# Patient Record
Sex: FEMALE | URBAN - METROPOLITAN AREA
[De-identification: names, ages, dates, MRNs, and addresses within clinical notes are randomized per-mention and may not be internally consistent; named-entity substitution may affect disease eponyms.]

---

## 2024-08-14 ENCOUNTER — CLAIMS CREATED FROM THE CLAIM WINDOW (OUTPATIENT)
Dept: URBAN - METROPOLITAN AREA MEDICAL CENTER 19 | Facility: MEDICAL CENTER | Age: 44
End: 2024-08-14
Payer: SELF-PAY

## 2024-08-14 DIAGNOSIS — K92.1 ACUTE MELENA: ICD-10-CM

## 2024-08-14 DIAGNOSIS — K92.0 BLOODY EMESIS: ICD-10-CM

## 2024-08-14 DIAGNOSIS — K74.69 CIRRHOSIS, CRYPTOGENIC: ICD-10-CM

## 2024-08-14 DIAGNOSIS — D62 ACUTE POSTHEMORRHAGIC ANEMIA: ICD-10-CM

## 2024-08-14 PROCEDURE — 99255 IP/OBS CONSLTJ NEW/EST HI 80: CPT | Performed by: INTERNAL MEDICINE

## 2024-08-14 PROCEDURE — 99223 1ST HOSP IP/OBS HIGH 75: CPT | Performed by: INTERNAL MEDICINE

## 2024-08-15 ENCOUNTER — CLAIMS CREATED FROM THE CLAIM WINDOW (OUTPATIENT)
Dept: URBAN - METROPOLITAN AREA MEDICAL CENTER 19 | Facility: MEDICAL CENTER | Age: 44
End: 2024-08-15
Payer: SELF-PAY

## 2024-08-15 DIAGNOSIS — K31.819 ACQUIRED ARTERIOVENOUS MALFORMATION OF DUODENUM: ICD-10-CM

## 2024-08-15 DIAGNOSIS — K29.60 ADENOPAPILLOMATOSIS GASTRICA: ICD-10-CM

## 2024-08-15 DIAGNOSIS — K22.89 OTHER SPECIFIED DISEASE OF ESOPHAGUS: ICD-10-CM

## 2024-08-15 PROCEDURE — 43270 EGD LESION ABLATION: CPT | Performed by: INTERNAL MEDICINE

## 2024-08-15 PROCEDURE — 43239 EGD BIOPSY SINGLE/MULTIPLE: CPT | Performed by: INTERNAL MEDICINE

## 2024-08-16 ENCOUNTER — CLAIMS CREATED FROM THE CLAIM WINDOW (OUTPATIENT)
Dept: URBAN - METROPOLITAN AREA MEDICAL CENTER 19 | Facility: MEDICAL CENTER | Age: 44
End: 2024-08-16
Payer: SELF-PAY

## 2024-08-16 DIAGNOSIS — K74.69 CIRRHOSIS, CRYPTOGENIC: ICD-10-CM

## 2024-08-16 DIAGNOSIS — R19.7 ACUTE DIARRHEA: ICD-10-CM

## 2024-08-16 DIAGNOSIS — D50.9 ANEMIA: ICD-10-CM

## 2024-08-16 DIAGNOSIS — K62.5 ANAL BLEEDING: ICD-10-CM

## 2024-08-16 PROCEDURE — 99232 SBSQ HOSP IP/OBS MODERATE 35: CPT | Performed by: INTERNAL MEDICINE

## 2024-08-17 ENCOUNTER — CLAIMS CREATED FROM THE CLAIM WINDOW (OUTPATIENT)
Dept: URBAN - METROPOLITAN AREA MEDICAL CENTER 19 | Facility: MEDICAL CENTER | Age: 44
End: 2024-08-17
Payer: SELF-PAY

## 2024-08-17 DIAGNOSIS — K92.1 HEMATOCHEZIA: ICD-10-CM

## 2024-08-17 PROCEDURE — 45378 DIAGNOSTIC COLONOSCOPY: CPT | Performed by: INTERNAL MEDICINE

## 2024-08-18 ENCOUNTER — TELEPHONE ENCOUNTER (OUTPATIENT)
Dept: URBAN - METROPOLITAN AREA CLINIC 113 | Facility: CLINIC | Age: 44
End: 2024-08-18

## 2024-08-28 ENCOUNTER — TELEPHONE ENCOUNTER (OUTPATIENT)
Dept: URBAN - METROPOLITAN AREA CLINIC 113 | Facility: CLINIC | Age: 44
End: 2024-08-28

## 2024-09-03 ENCOUNTER — TELEPHONE ENCOUNTER (OUTPATIENT)
Dept: URBAN - METROPOLITAN AREA CLINIC 113 | Facility: CLINIC | Age: 44
End: 2024-09-03

## 2024-09-16 ENCOUNTER — OFFICE VISIT (OUTPATIENT)
Dept: URBAN - METROPOLITAN AREA CLINIC 113 | Facility: CLINIC | Age: 44
End: 2024-09-16

## 2024-11-19 ENCOUNTER — DASHBOARD ENCOUNTERS (OUTPATIENT)
Age: 44
End: 2024-11-19

## 2024-11-19 ENCOUNTER — OFFICE VISIT (OUTPATIENT)
Dept: URBAN - METROPOLITAN AREA CLINIC 113 | Facility: CLINIC | Age: 44
End: 2024-11-19
Payer: COMMERCIAL

## 2024-11-19 VITALS — WEIGHT: 252 LBS | HEIGHT: 60 IN | RESPIRATION RATE: 20 BRPM | TEMPERATURE: 97.7 F | BODY MASS INDEX: 49.48 KG/M2

## 2024-11-19 DIAGNOSIS — D50.0 IRON DEFICIENCY ANEMIA DUE TO CHRONIC BLOOD LOSS: ICD-10-CM

## 2024-11-19 DIAGNOSIS — E03.9 ACQUIRED HYPOTHYROIDISM: ICD-10-CM

## 2024-11-19 DIAGNOSIS — K70.31 ALCOHOLIC CIRRHOSIS OF LIVER WITH ASCITES: ICD-10-CM

## 2024-11-19 DIAGNOSIS — K31.89 OTHER DISEASES OF STOMACH AND DUODENUM: ICD-10-CM

## 2024-11-19 DIAGNOSIS — K70.11 ALCOHOLIC HEPATITIS WITH ASCITES: ICD-10-CM

## 2024-11-19 DIAGNOSIS — K76.6 PORTAL HYPERTENSION: ICD-10-CM

## 2024-11-19 DIAGNOSIS — K31.819 AVM (ARTERIOVENOUS MALFORMATION) OF DUODENUM, ACQUIRED: ICD-10-CM

## 2024-11-19 PROBLEM — 420054005: Status: ACTIVE | Noted: 2024-11-19

## 2024-11-19 PROBLEM — 111566002: Status: ACTIVE | Noted: 2024-11-19

## 2024-11-19 PROBLEM — 303082009: Status: ACTIVE | Noted: 2024-11-19

## 2024-11-19 PROBLEM — 724556004: Status: ACTIVE | Noted: 2024-11-19

## 2024-11-19 PROBLEM — 1082611000119101: Status: ACTIVE | Noted: 2024-11-19

## 2024-11-19 PROCEDURE — 99214 OFFICE O/P EST MOD 30 MIN: CPT | Performed by: INTERNAL MEDICINE

## 2024-11-19 RX ORDER — PANTOPRAZOLE SODIUM 20 MG/1
1 TABLET 1/2 TO 1 HOUR BEFORE MORNING MEAL TABLET, DELAYED RELEASE ORAL ONCE A DAY
Status: ACTIVE | COMMUNITY

## 2024-11-19 NOTE — HPI-TODAY'S VISIT:
The patient is a very pleasant 44-year-old female who returns today for follow-up after hospital admission.  Patient was seen in consultation by my partner Dr. Hernandes on August 14 of this year when she presented with melena and newly diagnosed cirrhosis.  I have reviewed extensive records exceeding over 40 pages from her hospitalization at [facility]. Patient was seen in repeat consultation on August 30 of this year by my partner Dr. Oren Jane for abdominal swelling and further assistance in management of end-stage liver disease. She was seen by me on August 16 as a follow-up progress note where she had iron deficiency anemia cirrhosis with a meld of 15. Upper endoscopy performed by Dr. 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  Pathology of the stomach was unremarkable.  Colonoscopy performed by me on August 17 for rectal bleeding and iron deficiency anemia was performed without difficulty with an excellent prep.  Diverticulosis was present.  Bleeding internal hemorrhoids were noted.  No obvious rectal varices.  The fissure across one of the hemorrhoids. Echocardiogram dated August 16 was unremarkable with ejection fraction normal.  No evidence for right heart disease.  CT angiography performed August 13 revealed anasarca and hepatomegaly with perihepatic ascites.  CT scan of the abdomen and pelvis performed August 28 with contrast revealed once again anasarca and hepatosplenomegaly with perihepatic ascites.  Right upper quadrant ultrasound performed August 29 revealed hepatomegaly.  Elastography showed a stiffness of 16.4 consistent with high risk of cirrhosis. Laboratory testing dated August 31 revealed a hemoglobin of 7.5 and platelet count of 147.  MCV was normal at 96.6.  CMP dated August 28 revealed a total bilirubin of 2.2 AST 62 ALT 24 and alkaline phosphatase of 129.  BUN and creatinine were normal at 3 and 0.8.  INR was 1.3. Further laboratory testing included iron percent saturation low at 7 ferritin low at 5, B12 at the low end of normal at 355 haptoglobin normal at 114 TSH elevated at 16.  Hemoglobin A1c 5.4.  Free T4 low at 0.58. The patient states that she has remained abstinent and has had marked improvement in symptomatology.  Her fluid has disappeared.  She still occasionally has bright red blood in her bowel movements.  She is being considered for a GLP-1 agonist by her primary team.

## 2025-02-14 ENCOUNTER — LAB OUTSIDE AN ENCOUNTER (OUTPATIENT)
Dept: URBAN - METROPOLITAN AREA CLINIC 113 | Facility: CLINIC | Age: 45
End: 2025-02-14

## 2025-02-15 ENCOUNTER — WEB ENCOUNTER (OUTPATIENT)
Dept: URBAN - METROPOLITAN AREA CLINIC 113 | Facility: CLINIC | Age: 45
End: 2025-02-15

## 2025-02-15 LAB
A/G RATIO: 1.2
ABSOLUTE BASOPHILS: 62
ABSOLUTE EOSINOPHILS: 117
ABSOLUTE LYMPHOCYTES: 1973
ABSOLUTE MONOCYTES: 312
ABSOLUTE NEUTROPHILS: 5335
ALBUMIN: 4.4
ALKALINE PHOSPHATASE: 82
ALT (SGPT): 27
AST (SGOT): 39
BASOPHILS: 0.8
BILIRUBIN, TOTAL: 1.6
BUN/CREATININE RATIO: (no result)
BUN: 8
CALCIUM: 9.3
CARBON DIOXIDE, TOTAL: 28
CHLORIDE: 103
CREATININE: 0.81
EGFR: 92
EOSINOPHILS: 1.5
FERRITIN, SERUM: 12
GLOBULIN, TOTAL: 3.8
GLUCOSE: 77
HEMATOCRIT: 33.1
HEMOGLOBIN: 10.9
INR: 1.1
IRON BIND.CAP.(TIBC): 464
IRON SATURATION: 14
IRON: 65
LYMPHOCYTES: 25.3
MCH: 30.1
MCHC: 32.9
MCV: 91.4
MONOCYTES: 4
MPV: 12.8
NEUTROPHILS: 68.4
PLATELET COUNT: 134
POTASSIUM: 3.9
PROTEIN, TOTAL: 8.2
PT: 11.5
RDW: 15.2
RED BLOOD CELL COUNT: 3.62
SODIUM: 139
WHITE BLOOD CELL COUNT: 7.8

## 2025-02-15 RX ORDER — PANTOPRAZOLE SODIUM 20 MG/1
1 TABLET TABLET, DELAYED RELEASE ORAL ONCE A DAY
Qty: 90 TABLET | Refills: 3

## 2025-02-16 ENCOUNTER — TELEPHONE ENCOUNTER (OUTPATIENT)
Dept: URBAN - METROPOLITAN AREA CLINIC 113 | Facility: CLINIC | Age: 45
End: 2025-02-16

## 2025-03-18 ENCOUNTER — OFFICE VISIT (OUTPATIENT)
Dept: URBAN - METROPOLITAN AREA CLINIC 113 | Facility: CLINIC | Age: 45
End: 2025-03-18

## 2025-03-18 RX ORDER — PANTOPRAZOLE SODIUM 20 MG/1
1 TABLET TABLET, DELAYED RELEASE ORAL ONCE A DAY
Qty: 90 TABLET | Refills: 3 | Status: ACTIVE | COMMUNITY

## 2025-03-18 NOTE — HPI-TODAY'S VISIT:
The patient is a very pleasant 44-year-old female who returns today for follow-up after hospital admission.  Patient was seen in consultation by my partner Dr. Hernandes on August 14 of this year when she presented with melena and newly diagnosed cirrhosis.  I have reviewed extensive records exceeding over 40 pages from her hospitalization at [facility]. Patient was seen in repeat consultation on August 30 of this year by my partner Dr. Oren Jane for abdominal swelling and further assistance in management of end-stage liver disease. She was seen by me on August 16 as a follow-up progress note where she had iron deficiency anemia cirrhosis with a meld of 15. Upper endoscopy performed by Dr. 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  Pathology of the stomach was unremarkable.  Colonoscopy performed by me on August 17 for rectal bleeding and iron deficiency anemia was performed without difficulty with an excellent prep.  Diverticulosis was present.  Bleeding internal hemorrhoids were noted.  No obvious rectal varices.  The fissure across one of the hemorrhoids. Echocardiogram dated August 16 was unremarkable with ejection fraction normal.  No evidence for right heart disease.  CT angiography performed August 13 revealed anasarca and hepatomegaly with perihepatic ascites.  CT scan of the abdomen and pelvis performed August 28 with contrast revealed once again anasarca and hepatosplenomegaly with perihepatic ascites.  Right upper quadrant ultrasound performed August 29 revealed hepatomegaly.  Elastography showed a stiffness of 16.4 consistent with high risk of cirrhosis. Laboratory testing dated August 31 revealed a hemoglobin of 7.5 and platelet count of 147.  MCV was normal at 96.6.  CMP dated August 28 revealed a total bilirubin of 2.2 AST 62 ALT 24 and alkaline phosphatase of 129.  BUN and creatinine were normal at 3 and 0.8.  INR was 1.3. Further laboratory testing included iron percent saturation low at 7 ferritin low at 5, B12 at the low end of normal at 355 haptoglobin normal at 114 TSH elevated at 16.  Hemoglobin A1c 5.4.  Free T4 low at 0.58. The patient states that she has remained abstinent and has had marked improvement in symptomatology.  Her fluid has disappeared.  She still occasionally has bright red blood in her bowel movements.  She is being considered for a GLP-1 agonist by her primary team. Interval history, 3/18/2025.

## 2025-05-02 ENCOUNTER — OFFICE VISIT (OUTPATIENT)
Dept: URBAN - METROPOLITAN AREA CLINIC 113 | Facility: CLINIC | Age: 45
End: 2025-05-02
Payer: SELF-PAY

## 2025-05-02 DIAGNOSIS — F10.11 ALCOHOL ABUSE, IN REMISSION: ICD-10-CM

## 2025-05-02 DIAGNOSIS — K76.6 PORTAL HYPERTENSION: ICD-10-CM

## 2025-05-02 DIAGNOSIS — K31.819 AVM (ARTERIOVENOUS MALFORMATION) OF DUODENUM, ACQUIRED: ICD-10-CM

## 2025-05-02 DIAGNOSIS — K31.89 OTHER DISEASES OF STOMACH AND DUODENUM: ICD-10-CM

## 2025-05-02 DIAGNOSIS — E03.9 ACQUIRED HYPOTHYROIDISM: ICD-10-CM

## 2025-05-02 DIAGNOSIS — D50.0 IRON DEFICIENCY ANEMIA DUE TO CHRONIC BLOOD LOSS: ICD-10-CM

## 2025-05-02 DIAGNOSIS — R53.82 CHRONIC FATIGUE: ICD-10-CM

## 2025-05-02 DIAGNOSIS — K70.11 ALCOHOLIC HEPATITIS WITH ASCITES: ICD-10-CM

## 2025-05-02 DIAGNOSIS — K70.31 ALCOHOLIC CIRRHOSIS OF LIVER WITH ASCITES: ICD-10-CM

## 2025-05-02 PROBLEM — 84229001: Status: ACTIVE | Noted: 2025-05-02

## 2025-05-02 PROCEDURE — 99214 OFFICE O/P EST MOD 30 MIN: CPT | Performed by: INTERNAL MEDICINE

## 2025-05-02 RX ORDER — PANTOPRAZOLE SODIUM 20 MG/1
1 TABLET TABLET, DELAYED RELEASE ORAL ONCE A DAY
Qty: 90 TABLET | Refills: 3 | Status: ACTIVE | COMMUNITY

## 2025-05-02 NOTE — HPI-TODAY'S VISIT:
The patient is a very pleasant 44-year-old female who returns today for follow-up after hospital admission.  Patient was seen in consultation by my partner Dr. Hernandes on August 14 of this year when she presented with melena and newly diagnosed cirrhosis.  I have reviewed extensive records exceeding over 40 pages from her hospitalization at [facility]. Patient was seen in repeat consultation on August 30 of this year by my partner Dr. Oren Jane for abdominal swelling and further assistance in management of end-stage liver disease. She was seen by me on August 16 as a follow-up progress note where she had iron deficiency anemia cirrhosis with a meld of 15. Upper endoscopy performed by Dr. 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  Pathology of the stomach was unremarkable.  Colonoscopy performed by me on August 17 for rectal bleeding and iron deficiency anemia was performed without difficulty with an excellent prep.  Diverticulosis was present.  Bleeding internal hemorrhoids were noted.  No obvious rectal varices.  The fissure across one of the hemorrhoids. Echocardiogram dated August 16 was unremarkable with ejection fraction normal.  No evidence for right heart disease.  CT angiography performed August 13 revealed anasarca and hepatomegaly with perihepatic ascites.  CT scan of the abdomen and pelvis performed August 28 with contrast revealed once again anasarca and hepatosplenomegaly with perihepatic ascites.  Right upper quadrant ultrasound performed August 29 revealed hepatomegaly.  Elastography showed a stiffness of 16.4 consistent with high risk of cirrhosis. Laboratory testing dated August 31 revealed a hemoglobin of 7.5 and platelet count of 147.  MCV was normal at 96.6.  CMP dated August 28 revealed a total bilirubin of 2.2 AST 62 ALT 24 and alkaline phosphatase of 129.  BUN and creatinine were normal at 3 and 0.8.  INR was 1.3. Further laboratory testing included iron percent saturation low at 7 ferritin low at 5, B12 at the low end of normal at 355 haptoglobin normal at 114 TSH elevated at 16.  Hemoglobin A1c 5.4.  Free T4 low at 0.58. The patient states that she has remained abstinent and has had marked improvement in symptomatology.  Her fluid has disappeared.  She still occasionally has bright red blood in her bowel movements.  She is being considered for a GLP-1 agonist by her primary team. Interval history, 3/18/2025. Interval history 5/2/2025. Patient states recently she has developed some fairly severe fatigue.  She has not noted any melena or rectal bleeding.  She does admit that she discontinued her thyroid medication as she thought it was causing her hair to fall out.  In addition she states she stopped her iron tablet.  She states her menstrual cycles are fairly heavy.  She denies any alcohol consumption.

## 2025-05-14 ENCOUNTER — LAB OUTSIDE AN ENCOUNTER (OUTPATIENT)
Dept: URBAN - METROPOLITAN AREA CLINIC 113 | Facility: CLINIC | Age: 45
End: 2025-05-14

## 2025-05-15 LAB
A/G RATIO: 1.1
ABSOLUTE BASOPHILS: 61
ABSOLUTE EOSINOPHILS: 109
ABSOLUTE LYMPHOCYTES: 1945
ABSOLUTE MONOCYTES: 340
ABSOLUTE NEUTROPHILS: 4345
AFP, SERUM, TUMOR MARKER: 8
ALBUMIN: 4.3
ALKALINE PHOSPHATASE: 89
ALT (SGPT): 37
AST (SGOT): 47
BASOPHILS: 0.9
BILIRUBIN, TOTAL: 1.4
BUN/CREATININE RATIO: (no result)
BUN: 12
CALCIUM: 9.6
CARBON DIOXIDE, TOTAL: 28
CHLORIDE: 104
COMMENT(S): (no result)
CREATININE: 0.96
EGFR: 75
EOSINOPHILS: 1.6
FERRITIN, SERUM: 14
GLOBULIN, TOTAL: 3.9
GLUCOSE: 82
HEMATOCRIT: 33.1
HEMOGLOBIN: 10.8
LYMPHOCYTES: 28.6
MCH: 30.6
MCHC: 32.6
MCV: 93.8
MONOCYTES: 5
MPV: 13.3
NEUTROPHILS: 63.9
PLATELET COUNT: 123
POTASSIUM: 4.1
PROTEIN, TOTAL: 8.2
RDW: 15.4
RED BLOOD CELL COUNT: 3.53
SODIUM: 138
TSH: 34.37
WHITE BLOOD CELL COUNT: 6.8

## 2025-05-16 ENCOUNTER — LAB OUTSIDE AN ENCOUNTER (OUTPATIENT)
Dept: URBAN - METROPOLITAN AREA CLINIC 113 | Facility: CLINIC | Age: 45
End: 2025-05-16

## 2025-05-16 ENCOUNTER — TELEPHONE ENCOUNTER (OUTPATIENT)
Dept: URBAN - METROPOLITAN AREA CLINIC 113 | Facility: CLINIC | Age: 45
End: 2025-05-16

## 2025-05-20 ENCOUNTER — OFFICE VISIT (OUTPATIENT)
Dept: URBAN - METROPOLITAN AREA CLINIC 113 | Facility: CLINIC | Age: 45
End: 2025-05-20

## 2025-05-23 ENCOUNTER — WEB ENCOUNTER (OUTPATIENT)
Dept: URBAN - METROPOLITAN AREA CLINIC 113 | Facility: CLINIC | Age: 45
End: 2025-05-23

## 2025-05-23 ENCOUNTER — TELEPHONE ENCOUNTER (OUTPATIENT)
Dept: URBAN - METROPOLITAN AREA CLINIC 113 | Facility: CLINIC | Age: 45
End: 2025-05-23

## 2025-05-23 ENCOUNTER — OFFICE VISIT (OUTPATIENT)
Dept: URBAN - METROPOLITAN AREA CLINIC 113 | Facility: CLINIC | Age: 45
End: 2025-05-23

## 2025-05-23 RX ORDER — PANTOPRAZOLE SODIUM 20 MG/1
1 TABLET TABLET, DELAYED RELEASE ORAL ONCE A DAY
Qty: 90 TABLET | Refills: 3

## 2025-07-09 ENCOUNTER — OFFICE VISIT (OUTPATIENT)
Dept: URBAN - METROPOLITAN AREA CLINIC 113 | Facility: CLINIC | Age: 45
End: 2025-07-09
Payer: COMMERCIAL

## 2025-07-09 ENCOUNTER — LAB OUTSIDE AN ENCOUNTER (OUTPATIENT)
Dept: URBAN - METROPOLITAN AREA CLINIC 113 | Facility: CLINIC | Age: 45
End: 2025-07-09

## 2025-07-09 DIAGNOSIS — K31.819 AVM (ARTERIOVENOUS MALFORMATION) OF DUODENUM, ACQUIRED: ICD-10-CM

## 2025-07-09 DIAGNOSIS — F10.90 ACUTE ALCOHOL USE: ICD-10-CM

## 2025-07-09 DIAGNOSIS — K92.0 HEMATEMESIS WITHOUT NAUSEA: ICD-10-CM

## 2025-07-09 DIAGNOSIS — K70.31 ALCOHOLIC CIRRHOSIS OF LIVER WITH ASCITES: ICD-10-CM

## 2025-07-09 DIAGNOSIS — D50.0 IRON DEFICIENCY ANEMIA DUE TO CHRONIC BLOOD LOSS: ICD-10-CM

## 2025-07-09 DIAGNOSIS — R77.2 ELEVATED AFP: ICD-10-CM

## 2025-07-09 DIAGNOSIS — K76.6 PORTAL HYPERTENSION: ICD-10-CM

## 2025-07-09 DIAGNOSIS — E03.9 ACQUIRED HYPOTHYROIDISM: ICD-10-CM

## 2025-07-09 PROCEDURE — 99214 OFFICE O/P EST MOD 30 MIN: CPT | Performed by: INTERNAL MEDICINE

## 2025-07-09 RX ORDER — OMEPRAZOLE 40 MG/1
1 CAPSULE 30 MINUTES BEFORE MORNING MEAL CAPSULE, DELAYED RELEASE ORAL ONCE A DAY
Qty: 90 | Refills: 3 | OUTPATIENT
Start: 2025-07-09

## 2025-07-09 RX ORDER — PANTOPRAZOLE SODIUM 20 MG/1
1 TABLET TABLET, DELAYED RELEASE ORAL ONCE A DAY
Qty: 90 TABLET | Refills: 3 | Status: ACTIVE | COMMUNITY

## 2025-07-09 NOTE — HPI-TODAY'S VISIT:
The patient is a very pleasant 44-year-old female who returns today for follow-up after hospital admission.  Patient was seen in consultation by my partner Dr. Hernandes on August 14 of this year when she presented with melena and newly diagnosed cirrhosis.  I have reviewed extensive records exceeding over 40 pages from her hospitalization at [facility]. Patient was seen in repeat consultation on August 30 of this year by my partner Dr. Oren Jane for abdominal swelling and further assistance in management of end-stage liver disease. She was seen by me on August 16 as a follow-up progress note where she had iron deficiency anemia cirrhosis with a meld of 15. Upper endoscopy performed by Dr. 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  Pathology of the stomach was unremarkable.  Colonoscopy performed by me on August 17 for rectal bleeding and iron deficiency anemia was performed without difficulty with an excellent prep.  Diverticulosis was present.  Bleeding internal hemorrhoids were noted.  No obvious rectal varices.  The fissure across one of the hemorrhoids. Echocardiogram dated August 16 was unremarkable with ejection fraction normal.  No evidence for right heart disease.  CT angiography performed August 13 revealed anasarca and hepatomegaly with perihepatic ascites.  CT scan of the abdomen and pelvis performed August 28 with contrast revealed once again anasarca and hepatosplenomegaly with perihepatic ascites.  Right upper quadrant ultrasound performed August 29 revealed hepatomegaly.  Elastography showed a stiffness of 16.4 consistent with high risk of cirrhosis. Laboratory testing dated August 31 revealed a hemoglobin of 7.5 and platelet count of 147.  MCV was normal at 96.6.  CMP dated August 28 revealed a total bilirubin of 2.2 AST 62 ALT 24 and alkaline phosphatase of 129.  BUN and creatinine were normal at 3 and 0.8.  INR was 1.3. Further laboratory testing included iron percent saturation low at 7 ferritin low at 5, B12 at the low end of normal at 355 haptoglobin normal at 114 TSH elevated at 16.  Hemoglobin A1c 5.4.  Free T4 low at 0.58. The patient states that she has remained abstinent and has had marked improvement in symptomatology.  Her fluid has disappeared.  She still occasionally has bright red blood in her bowel movements.  She is being considered for a GLP-1 agonist by her primary team. Interval history, 3/18/2025. Interval history 5/2/2025. Patient states recently she has developed some fairly severe fatigue.  She has not noted any melena or rectal bleeding.  She does admit that she discontinued her thyroid medication as she thought it was causing her hair to fall out.  In addition she states she stopped her iron tablet.  She states her menstrual cycles are fairly heavy.  She denies any alcohol consumption. Interval history, 7/9/2025. The patient states a few days ago she had hematemesis after drinking alcohol.  She states she was seen by my partner Dr. Hernandes in the hospital though unfortunately I have no hospital records.  Patient states she is back on her thyroid medicine and her iron tablet but she thinks they cause buzzing in her ears.  She states she is back to being abstinent.

## 2025-07-10 ENCOUNTER — TELEPHONE ENCOUNTER (OUTPATIENT)
Dept: URBAN - METROPOLITAN AREA CLINIC 113 | Facility: CLINIC | Age: 45
End: 2025-07-10

## 2025-07-11 ENCOUNTER — LAB OUTSIDE AN ENCOUNTER (OUTPATIENT)
Dept: URBAN - METROPOLITAN AREA CLINIC 113 | Facility: CLINIC | Age: 45
End: 2025-07-11

## 2025-07-12 ENCOUNTER — TELEPHONE ENCOUNTER (OUTPATIENT)
Dept: URBAN - METROPOLITAN AREA CLINIC 113 | Facility: CLINIC | Age: 45
End: 2025-07-12

## 2025-07-13 ENCOUNTER — CLAIMS CREATED FROM THE CLAIM WINDOW (OUTPATIENT)
Dept: URBAN - METROPOLITAN AREA MEDICAL CENTER 19 | Facility: MEDICAL CENTER | Age: 45
End: 2025-07-13
Payer: COMMERCIAL

## 2025-07-13 DIAGNOSIS — D62 ACUTE POSTHEMORRHAGIC ANEMIA: ICD-10-CM

## 2025-07-13 DIAGNOSIS — K70.31 ALCOHOLIC CIRRHOSIS OF LIVER WITH ASCITES: ICD-10-CM

## 2025-07-13 DIAGNOSIS — K92.2 GASTROINTESTINAL HEMORRHAGE, UNSPECIFIED: ICD-10-CM

## 2025-07-13 PROCEDURE — 99233 SBSQ HOSP IP/OBS HIGH 50: CPT | Performed by: INTERNAL MEDICINE

## 2025-07-14 ENCOUNTER — CLAIMS CREATED FROM THE CLAIM WINDOW (OUTPATIENT)
Dept: URBAN - METROPOLITAN AREA MEDICAL CENTER 19 | Facility: MEDICAL CENTER | Age: 45
End: 2025-07-14
Payer: COMMERCIAL

## 2025-07-14 ENCOUNTER — TELEPHONE ENCOUNTER (OUTPATIENT)
Dept: URBAN - METROPOLITAN AREA CLINIC 113 | Facility: CLINIC | Age: 45
End: 2025-07-14

## 2025-07-14 DIAGNOSIS — D62 ACUTE POSTHEMORRHAGIC ANEMIA: ICD-10-CM

## 2025-07-14 DIAGNOSIS — K70.31 ALCOHOLIC CIRRHOSIS OF LIVER WITH ASCITES: ICD-10-CM

## 2025-07-14 DIAGNOSIS — K92.2 GASTROINTESTINAL HEMORRHAGE, UNSPECIFIED: ICD-10-CM

## 2025-07-14 LAB
A/G RATIO: 1.5
ABSOLUTE BASOPHILS: 18
ABSOLUTE EOSINOPHILS: 60
ABSOLUTE LYMPHOCYTES: 1962
ABSOLUTE MONOCYTES: 330
ABSOLUTE NEUTROPHILS: 3630
ALBUMIN: 4.1
ALKALINE PHOSPHATASE: 83
ALT (SGPT): 26
AST (SGOT): 26
BASOPHILS: 0.3
BILIRUBIN, TOTAL: 1.2
BUN/CREATININE RATIO: (no result)
BUN: 11
CALCIUM: 9
CARBON DIOXIDE, TOTAL: 26
CBC MORPHOLOGY: (no result)
CHLORIDE: 107
CREATININE: 0.72
EGFR: 106
EOSINOPHILS: 1
GLOBULIN, TOTAL: 2.8
GLUCOSE: 80
HEMATOCRIT: 18.2
HEMOGLOBIN: 5.5
INR: 1
LYMPHOCYTES: 32.7
MCH: 29.4
MCHC: 30.2
MCV: 97.3
MONOCYTES: 5.5
MPV: 12.7
NEUTROPHILS: 60.5
PLATELET COUNT: 163
POTASSIUM: 4.2
PROTEIN, TOTAL: 6.9
PT: 11
RDW: 18.8
RED BLOOD CELL COUNT: 1.87
SODIUM: 139
WHITE BLOOD CELL COUNT: 6

## 2025-07-14 PROCEDURE — 43239 EGD BIOPSY SINGLE/MULTIPLE: CPT | Performed by: INTERNAL MEDICINE

## 2025-07-16 ENCOUNTER — LAB OUTSIDE AN ENCOUNTER (OUTPATIENT)
Dept: URBAN - METROPOLITAN AREA CLINIC 113 | Facility: CLINIC | Age: 45
End: 2025-07-16

## 2025-07-24 ENCOUNTER — OFFICE VISIT (OUTPATIENT)
Dept: URBAN - METROPOLITAN AREA MEDICAL CENTER 19 | Facility: MEDICAL CENTER | Age: 45
End: 2025-07-24